# Patient Record
Sex: MALE | Race: WHITE | Employment: UNEMPLOYED | URBAN - METROPOLITAN AREA
[De-identification: names, ages, dates, MRNs, and addresses within clinical notes are randomized per-mention and may not be internally consistent; named-entity substitution may affect disease eponyms.]

---

## 2019-05-13 ENCOUNTER — HOSPITAL ENCOUNTER (EMERGENCY)
Age: 49
Discharge: HOME | End: 2019-05-13
Attending: EMERGENCY MEDICINE
Payer: COMMERCIAL

## 2019-05-13 VITALS
OXYGEN SATURATION: 100 % | HEIGHT: 75 IN | RESPIRATION RATE: 20 BRPM | BODY MASS INDEX: 34.82 KG/M2 | SYSTOLIC BLOOD PRESSURE: 138 MMHG | HEART RATE: 85 BPM | WEIGHT: 280 LBS | DIASTOLIC BLOOD PRESSURE: 88 MMHG | TEMPERATURE: 97.7 F

## 2019-05-13 DIAGNOSIS — M54.42 CHRONIC BILATERAL LOW BACK PAIN WITH BILATERAL SCIATICA: Primary | ICD-10-CM

## 2019-05-13 DIAGNOSIS — M54.41 CHRONIC BILATERAL LOW BACK PAIN WITH BILATERAL SCIATICA: Primary | ICD-10-CM

## 2019-05-13 DIAGNOSIS — G89.29 CHRONIC BILATERAL LOW BACK PAIN WITH BILATERAL SCIATICA: Primary | ICD-10-CM

## 2019-05-13 PROCEDURE — 74011250636 HC RX REV CODE- 250/636: Performed by: PHYSICIAN ASSISTANT

## 2019-05-13 PROCEDURE — 99282 EMERGENCY DEPT VISIT SF MDM: CPT

## 2019-05-13 PROCEDURE — 96372 THER/PROPH/DIAG INJ SC/IM: CPT

## 2019-05-13 RX ORDER — TRAMADOL HYDROCHLORIDE 50 MG/1
50 TABLET ORAL
Qty: 12 TAB | Refills: 0 | Status: SHIPPED | OUTPATIENT
Start: 2019-05-13 | End: 2019-05-16

## 2019-05-13 RX ORDER — TIZANIDINE 4 MG/1
1 TABLET ORAL
COMMUNITY
Start: 2019-01-21 | End: 2019-05-13

## 2019-05-13 RX ORDER — KETOROLAC TROMETHAMINE 30 MG/ML
60 INJECTION, SOLUTION INTRAMUSCULAR; INTRAVENOUS
Status: COMPLETED | OUTPATIENT
Start: 2019-05-13 | End: 2019-05-13

## 2019-05-13 RX ORDER — CHLORZOXAZONE 500 MG/1
500 TABLET ORAL
Qty: 21 TAB | Refills: 0 | Status: SHIPPED | OUTPATIENT
Start: 2019-05-13

## 2019-05-13 RX ORDER — METHYLPREDNISOLONE 4 MG/1
TABLET ORAL
Qty: 1 DOSE PACK | Refills: 0 | Status: SHIPPED | OUTPATIENT
Start: 2019-05-13

## 2019-05-13 RX ORDER — TRAZODONE HYDROCHLORIDE 100 MG/1
100 TABLET ORAL
COMMUNITY
Start: 2019-01-21

## 2019-05-13 RX ADMIN — KETOROLAC TROMETHAMINE 60 MG: 30 INJECTION, SOLUTION INTRAMUSCULAR at 13:05

## 2019-05-13 NOTE — ED PROVIDER NOTES
EMERGENCY DEPARTMENT HISTORY AND PHYSICAL EXAM 
 
Date: 5/13/2019 Patient Name: Amy Mcmahon History of Presenting Illness Chief Complaint Patient presents with  Back Pain History Provided By: Patient Chief Complaint: low back pain HPI(Context):  
1:03 PM 
Amy Mcmahon is a 52 y.o. male with PMHX of chronic low back, chronic right shoulder pain, COPD who presents to the emergency department C/O low back pain. Associated sxs include radiation to BLE. Pain is worse with movement and better with rest. Reports weather flares up chronic pain. Pt denies numbness, weakness, urinary/fecal incontinence, urinary retention, and any other sxs or complaints. No relief with OTC meds. Pt has seen neurosurgery and orthopedist who recommended PT but pt states it doesn't help pain. No new trauma. PCP: None Current Outpatient Medications Medication Sig Dispense Refill  traZODone (DESYREL) 100 mg tablet Take 100 mg by mouth.  chlorzoxazone (PARAFON FORTE DSC) 500 mg tablet Take 1 Tab by mouth three (3) times daily as needed for Muscle Spasm(s). 21 Tab 0  
 traMADol (ULTRAM) 50 mg tablet Take 1 Tab by mouth every four (4) hours as needed for Pain for up to 3 days. Max Daily Amount: 300 mg. 12 Tab 0  
 methylPREDNISolone (MEDROL, LAURA,) 4 mg tablet Take with food. 1 Dose Pack 0 Past History Past Medical History: 
Past Medical History:  
Diagnosis Date  Back pain Past Surgical History: 
History reviewed. No pertinent surgical history. Family History: 
History reviewed. No pertinent family history. Social History: 
Social History Tobacco Use  Smoking status: Current Every Day Smoker  Smokeless tobacco: Never Used Substance Use Topics  Alcohol use: Not Currently  Drug use: Not on file Allergies: 
No Known Allergies Review of Systems Review of Systems Constitutional: Negative for fever. Gastrointestinal: Negative for abdominal pain. Genitourinary: Negative for difficulty urinating and dysuria. Musculoskeletal: Positive for back pain and myalgias. Neurological: Negative for weakness and numbness. All other systems reviewed and are negative. Physical Exam  
 
Vitals:  
 05/13/19 1230 BP: 138/88 Pulse: 85 Resp: 20 Temp: 97.7 °F (36.5 °C) SpO2: 100% Weight: 127 kg (280 lb) Height: 6' 3\" (1.905 m) Physical Exam  
Constitutional: He is oriented to person, place, and time. He appears well-developed and well-nourished. No distress.  male in NAD. Alert. Appears comfortable. SO at bedside. HENT:  
Head: Normocephalic and atraumatic. Right Ear: External ear normal.  
Left Ear: External ear normal.  
Nose: Nose normal.  
Eyes: Conjunctivae are normal.  
Neck: Normal range of motion. Cardiovascular: Normal rate, regular rhythm, normal heart sounds and intact distal pulses. Exam reveals no gallop and no friction rub. No murmur heard. Pulmonary/Chest: Effort normal and breath sounds normal. No accessory muscle usage. No tachypnea. No respiratory distress. He has no decreased breath sounds. He has no wheezes. He has no rhonchi. He has no rales. Abdominal: Soft. Normal appearance and bowel sounds are normal. He exhibits no distension and no mass. There is no tenderness. There is no rigidity and no guarding. Musculoskeletal:  
     Thoracic back: He exhibits normal range of motion, no tenderness, no bony tenderness, no pain and no spasm. Lumbar back: He exhibits decreased range of motion, tenderness and pain. He exhibits no swelling, no deformity (no step off) and no spasm. Back: 
 
Neurological: He is alert and oriented to person, place, and time. He has normal strength. GCS eye subscore is 4. GCS verbal subscore is 5. GCS motor subscore is 6. Skin: Skin is warm and dry. He is not diaphoretic. Psychiatric: He has a normal mood and affect. Judgment normal.  
Nursing note and vitals reviewed. Diagnostic Study Results Labs - No results found for this or any previous visit (from the past 12 hour(s)). No orders to display CT Results  (Last 48 hours) None CXR Results  (Last 48 hours) None Medications given in the ED- Medications  
ketorolac tromethamine (TORADOL) 60 mg/2 mL injection 60 mg (60 mg IntraMUSCular Given 5/13/19 1305) Medical Decision Making I am the first provider for this patient. I reviewed the vital signs, available nursing notes, past medical history, past surgical history, family history and social history. Vital Signs-Reviewed the patient's vital signs. Pulse Oximetry Analysis - 100% on RA Records Reviewed: Nursing Notes and Old Medical Records Provider Notes (Medical Decision Making): CC of back pain that is reproducible on exam w/ movement/ROM/palpation. Reports it is consistent with prior episodes of back pain. No abdominal complaints/abdomen non tender on exam. No pulsatile mass appreciated. Normal neurologic exam. Not immunosupressed. Doubt epidural abscess, infection, neoplastic etiology. Not consistent with cauda equina. No trauma or midline tenderness. Doubt fracture. Most c/w musculoskeletal etiology. The patient shows no signs or symptoms of developing complication requiring inpatient treatment or management. Further laboratory or radiological investigation is not indicated. Will treat symptomatically with return immediately for new or worsening symptoms. The importance of close follow-up with PMD emphasized to patient. Procedures: 
Procedures ED Course:  
1:03 PM Initial assessment performed. The patients presenting problems have been discussed, and they are in agreement with the care plan formulated and outlined with them. I have encouraged them to ask questions as they arise throughout their visit. Diagnosis and Disposition No low back alarm sxs. Ambulatory. Ortho FUReasons to RTED discussed with pt. All questions answered. Pt feels comfortable going home at this time. Pt expressed understanding and he agrees with plan. 1. Chronic bilateral low back pain with bilateral sciatica PLAN: 
1. D/C Home 2. Current Discharge Medication List  
  
START taking these medications Details  
chlorzoxazone (PARAFON FORTE DSC) 500 mg tablet Take 1 Tab by mouth three (3) times daily as needed for Muscle Spasm(s). Qty: 21 Tab, Refills: 0  
  
traMADol (ULTRAM) 50 mg tablet Take 1 Tab by mouth every four (4) hours as needed for Pain for up to 3 days. Max Daily Amount: 300 mg. Qty: 12 Tab, Refills: 0 Associated Diagnoses: Chronic bilateral low back pain with bilateral sciatica  
  
methylPREDNISolone (MEDROL, LAURA,) 4 mg tablet Take with food. Qty: 1 Dose Pack, Refills: 0 STOP taking these medications tiZANidine (ZANAFLEX) 4 mg tablet Comments:  
Reason for Stopping: 3.  
Follow-up Information Follow up With Specialties Details Why Contact Info Leeann Kelly MD Orthopedic Surgery   [address] [place] [address] [address] [place] [address] 
[phone] [facility] EMERGENCY DEPT Emergency Medicine  As needed, If symptoms worsen [address] [id number] 
[phone]  
  
 
_______________________________ Attestations: This note is prepared by Jame Escobedo PA-C. 
_______________________________ Please note that this dictation was completed with [employer], the computer voice recognition software. Quite often unanticipated grammatical, syntax, homophones, and other interpretive errors are inadvertently transcribed by the computer software. Please disregard these errors. Please excuse any errors that have escaped final proofreading.

## 2019-05-13 NOTE — ED TRIAGE NOTES
Patient with a pre-existing back injury and states that he had a back flare up that started last night . Patient states that the pain is in the lower back and radiates down both legs.